# Patient Record
Sex: MALE | Race: WHITE | NOT HISPANIC OR LATINO
[De-identification: names, ages, dates, MRNs, and addresses within clinical notes are randomized per-mention and may not be internally consistent; named-entity substitution may affect disease eponyms.]

---

## 2019-07-15 PROBLEM — Z00.00 ENCOUNTER FOR PREVENTIVE HEALTH EXAMINATION: Status: ACTIVE | Noted: 2019-07-15

## 2019-07-26 ENCOUNTER — APPOINTMENT (OUTPATIENT)
Dept: INTERNAL MEDICINE | Facility: CLINIC | Age: 64
End: 2019-07-26
Payer: COMMERCIAL

## 2019-07-26 VITALS
RESPIRATION RATE: 18 BRPM | TEMPERATURE: 97.7 F | BODY MASS INDEX: 24.65 KG/M2 | HEART RATE: 100 BPM | OXYGEN SATURATION: 97 % | DIASTOLIC BLOOD PRESSURE: 76 MMHG | HEIGHT: 73 IN | SYSTOLIC BLOOD PRESSURE: 124 MMHG | WEIGHT: 186 LBS

## 2019-07-26 DIAGNOSIS — E03.9 HYPOTHYROIDISM, UNSPECIFIED: ICD-10-CM

## 2019-07-26 DIAGNOSIS — E11.9 TYPE 2 DIABETES MELLITUS W/OUT COMPLICATIONS: ICD-10-CM

## 2019-07-26 DIAGNOSIS — I10 ESSENTIAL (PRIMARY) HYPERTENSION: ICD-10-CM

## 2019-07-26 DIAGNOSIS — R05 COUGH: ICD-10-CM

## 2019-07-26 DIAGNOSIS — R09.82 POSTNASAL DRIP: ICD-10-CM

## 2019-07-26 PROCEDURE — 94729 DIFFUSING CAPACITY: CPT

## 2019-07-26 PROCEDURE — 99204 OFFICE O/P NEW MOD 45 MIN: CPT | Mod: 25

## 2019-07-26 PROCEDURE — 94727 GAS DIL/WSHOT DETER LNG VOL: CPT

## 2019-07-26 PROCEDURE — ZZZZZ: CPT

## 2019-07-26 PROCEDURE — 94060 EVALUATION OF WHEEZING: CPT

## 2019-07-26 NOTE — HISTORY OF PRESENT ILLNESS
[FreeTextEntry1] : This is the first visit here for this 65-year-old male is accompanied today by his girlfriend. He has a history of diabetes mellitus type 1, hypertension, and hypothyroidism. His girlfriend has told the patient she has noted that he has a cough for at least 8 months. This is mostly dry or occasionally scant sputum. No dyspnea on exertion, or hemoptysis. He has no history of asthma or COPD. He never smoked cigarettes. He did have some secondhand smoke exposure during childhood. His apartment has some dust as well as mildewy smell in the basement. He does notice postnasal drip. He has no history of GERD, but does have bedtime snacks.\par \par His girlfriend is concerned because her father did have pulmonary fibrosis.\par \par The patient has no family history of pulmonary fibrosis. His father was a smoker and did have emphysema later on in his life.

## 2019-07-26 NOTE — ASSESSMENT
[FreeTextEntry1] : #1 chronic cough. Consider component of postnasal drip. Patient will use nasal saline spray p.r.n. Flonase 2 sprays per nostril per day. Possible component of reflux. GERD regimen. No bedtime snacks. Pepcid 20 mg p.o. q.d. p.r.n. Discussed dust control of the patient's apartment. Continuous dehumidifier for basement. Will check PA and lateral chest x-ray. For following appointment in one month. Will likely need CT scan of chest.\par \par #2 DM, type 1.\par \par #3 HTN.\par \par #4 Hypothyroidism.\par \par RV 1 month.

## 2019-07-26 NOTE — PROCEDURE
[FreeTextEntry1] : Full PFT's today are within normal limits with an FEV1 of 4.41 or 111% predicted. Diffusing capacity is normal.

## 2019-07-26 NOTE — PHYSICAL EXAM
[General Appearance - Well Developed] : well developed [Normal Appearance] : normal appearance [General Appearance - Well Nourished] : well nourished [Well Groomed] : well groomed [General Appearance - In No Acute Distress] : no acute distress [No Deformities] : no deformities [Normal Conjunctiva] : the conjunctiva exhibited no abnormalities [Normal Oropharynx] : normal oropharynx [Neck Appearance] : the appearance of the neck was normal [Eyelids - No Xanthelasma] : the eyelids demonstrated no xanthelasmas [Neck Cervical Mass (___cm)] : no neck mass was observed [Jugular Venous Distention Increased] : there was no jugular-venous distention [Murmurs] : no murmurs present [Heart Sounds] : normal S1 and S2 [Heart Rate And Rhythm] : heart rate and rhythm were normal [Edema] : no peripheral edema present [Exaggerated Use Of Accessory Muscles For Inspiration] : no accessory muscle use [Respiration, Rhythm And Depth] : normal respiratory rhythm and effort [Auscultation Breath Sounds / Voice Sounds] : lungs were clear to auscultation bilaterally [Abdomen Soft] : soft [Abdomen Tenderness] : non-tender [Cyanosis, Localized] : no localized cyanosis [Skin Turgor] : normal skin turgor [Abnormal Walk] : normal gait [Nail Clubbing] : no clubbing of the fingernails [] : no rash [No Focal Deficits] : no focal deficits [Impaired Insight] : insight and judgment were intact [Affect] : the affect was normal [FreeTextEntry1] : rare crackles

## 2019-08-30 ENCOUNTER — APPOINTMENT (OUTPATIENT)
Dept: INTERNAL MEDICINE | Facility: CLINIC | Age: 64
End: 2019-08-30

## 2021-10-06 PROBLEM — I10 ESSENTIAL HYPERTENSION: Status: ACTIVE | Noted: 2019-07-26

## 2022-02-25 ENCOUNTER — NEW PATIENT (OUTPATIENT)
Dept: URBAN - METROPOLITAN AREA CLINIC 47 | Facility: CLINIC | Age: 67
End: 2022-02-25

## 2022-02-25 DIAGNOSIS — H52.03: ICD-10-CM

## 2022-02-25 DIAGNOSIS — E10.3291: ICD-10-CM

## 2022-02-25 DIAGNOSIS — H25.813: ICD-10-CM

## 2022-02-25 DIAGNOSIS — H52.4: ICD-10-CM

## 2022-02-25 PROCEDURE — 92015 DETERMINE REFRACTIVE STATE: CPT

## 2022-02-25 PROCEDURE — 92004 COMPRE OPH EXAM NEW PT 1/>: CPT

## 2022-02-25 ASSESSMENT — VISUAL ACUITY
OS_BAT: 20/40 W/ MR
OS_SC: J8
OU_SC: J4
OS_SC: 20/50
OU_SC: 20/30
OD_SC: J10
OD_SC: 20/50-2
OD_BAT: 20/30 W/ MR

## 2022-02-25 ASSESSMENT — TONOMETRY
OD_IOP_MMHG: 15
OS_IOP_MMHG: 15

## 2022-09-15 ENCOUNTER — FOLLOW UP (OUTPATIENT)
Dept: URBAN - METROPOLITAN AREA CLINIC 47 | Facility: CLINIC | Age: 67
End: 2022-09-15

## 2022-09-15 DIAGNOSIS — E10.3291: ICD-10-CM

## 2022-09-15 DIAGNOSIS — H25.813: ICD-10-CM

## 2022-09-15 PROCEDURE — 99214 OFFICE O/P EST MOD 30 MIN: CPT

## 2022-09-15 ASSESSMENT — VISUAL ACUITY
OS_SC: 20/30-1
OU_SC: 20/25
OD_SC: 20/40

## 2022-09-15 ASSESSMENT — TONOMETRY
OD_IOP_MMHG: 16
OS_IOP_MMHG: 16